# Patient Record
Sex: FEMALE | Race: OTHER | NOT HISPANIC OR LATINO
[De-identification: names, ages, dates, MRNs, and addresses within clinical notes are randomized per-mention and may not be internally consistent; named-entity substitution may affect disease eponyms.]

---

## 2017-06-11 ENCOUNTER — FORM ENCOUNTER (OUTPATIENT)
Age: 62
End: 2017-06-11

## 2017-06-12 ENCOUNTER — APPOINTMENT (OUTPATIENT)
Dept: MAMMOGRAPHY | Facility: IMAGING CENTER | Age: 62
End: 2017-06-12
Payer: COMMERCIAL

## 2017-06-12 ENCOUNTER — APPOINTMENT (OUTPATIENT)
Dept: ULTRASOUND IMAGING | Facility: IMAGING CENTER | Age: 62
End: 2017-06-12
Payer: COMMERCIAL

## 2017-06-12 ENCOUNTER — OUTPATIENT (OUTPATIENT)
Dept: OUTPATIENT SERVICES | Facility: HOSPITAL | Age: 62
LOS: 1 days | End: 2017-06-12
Payer: COMMERCIAL

## 2017-06-12 DIAGNOSIS — N64.89 OTHER SPECIFIED DISORDERS OF BREAST: ICD-10-CM

## 2017-06-12 PROCEDURE — 76641 ULTRASOUND BREAST COMPLETE: CPT | Mod: 26,50

## 2017-06-12 PROCEDURE — G0202: CPT | Mod: 26

## 2017-06-12 PROCEDURE — 77063 BREAST TOMOSYNTHESIS BI: CPT | Mod: 26

## 2017-06-12 PROCEDURE — 76641 ULTRASOUND BREAST COMPLETE: CPT

## 2017-06-12 PROCEDURE — 77063 BREAST TOMOSYNTHESIS BI: CPT

## 2017-06-12 PROCEDURE — 77067 SCR MAMMO BI INCL CAD: CPT

## 2017-06-27 ENCOUNTER — RESULT REVIEW (OUTPATIENT)
Age: 62
End: 2017-06-27

## 2017-07-24 ENCOUNTER — APPOINTMENT (OUTPATIENT)
Dept: SURGICAL ONCOLOGY | Facility: CLINIC | Age: 62
End: 2017-07-24

## 2017-07-24 VITALS
HEIGHT: 61 IN | SYSTOLIC BLOOD PRESSURE: 106 MMHG | HEART RATE: 71 BPM | WEIGHT: 98 LBS | DIASTOLIC BLOOD PRESSURE: 70 MMHG | RESPIRATION RATE: 15 BRPM | BODY MASS INDEX: 18.5 KG/M2

## 2017-07-24 DIAGNOSIS — R92.8 OTHER ABNORMAL AND INCONCLUSIVE FINDINGS ON DIAGNOSTIC IMAGING OF BREAST: ICD-10-CM

## 2017-07-24 DIAGNOSIS — Z80.3 FAMILY HISTORY OF MALIGNANT NEOPLASM OF BREAST: ICD-10-CM

## 2018-07-16 ENCOUNTER — FORM ENCOUNTER (OUTPATIENT)
Age: 63
End: 2018-07-16

## 2018-07-17 ENCOUNTER — APPOINTMENT (OUTPATIENT)
Dept: MAMMOGRAPHY | Facility: IMAGING CENTER | Age: 63
End: 2018-07-17
Payer: COMMERCIAL

## 2018-07-17 ENCOUNTER — APPOINTMENT (OUTPATIENT)
Dept: ULTRASOUND IMAGING | Facility: IMAGING CENTER | Age: 63
End: 2018-07-17
Payer: COMMERCIAL

## 2018-07-17 ENCOUNTER — OUTPATIENT (OUTPATIENT)
Dept: OUTPATIENT SERVICES | Facility: HOSPITAL | Age: 63
LOS: 1 days | End: 2018-07-17
Payer: COMMERCIAL

## 2018-07-17 DIAGNOSIS — N64.89 OTHER SPECIFIED DISORDERS OF BREAST: ICD-10-CM

## 2018-07-17 PROCEDURE — 76641 ULTRASOUND BREAST COMPLETE: CPT | Mod: 26,50

## 2018-07-17 PROCEDURE — 77067 SCR MAMMO BI INCL CAD: CPT | Mod: 26

## 2018-07-17 PROCEDURE — 77063 BREAST TOMOSYNTHESIS BI: CPT

## 2018-07-17 PROCEDURE — 77063 BREAST TOMOSYNTHESIS BI: CPT | Mod: 26

## 2018-07-17 PROCEDURE — 76641 ULTRASOUND BREAST COMPLETE: CPT

## 2018-07-17 PROCEDURE — 77067 SCR MAMMO BI INCL CAD: CPT

## 2018-07-19 PROBLEM — R92.8 ABNORMAL FINDING ON BREAST IMAGING: Status: ACTIVE | Noted: 2018-07-19

## 2018-07-23 ENCOUNTER — APPOINTMENT (OUTPATIENT)
Dept: SURGICAL ONCOLOGY | Facility: CLINIC | Age: 63
End: 2018-07-23
Payer: COMMERCIAL

## 2018-07-23 VITALS
WEIGHT: 100 LBS | SYSTOLIC BLOOD PRESSURE: 100 MMHG | BODY MASS INDEX: 18.88 KG/M2 | OXYGEN SATURATION: 98 % | DIASTOLIC BLOOD PRESSURE: 65 MMHG | HEART RATE: 70 BPM | HEIGHT: 61 IN

## 2018-07-23 PROCEDURE — 99213 OFFICE O/P EST LOW 20 MIN: CPT

## 2018-07-24 ENCOUNTER — FORM ENCOUNTER (OUTPATIENT)
Age: 63
End: 2018-07-24

## 2018-07-25 ENCOUNTER — OUTPATIENT (OUTPATIENT)
Dept: OUTPATIENT SERVICES | Facility: HOSPITAL | Age: 63
LOS: 1 days | End: 2018-07-25
Payer: COMMERCIAL

## 2018-07-25 ENCOUNTER — APPOINTMENT (OUTPATIENT)
Dept: ULTRASOUND IMAGING | Facility: IMAGING CENTER | Age: 63
End: 2018-07-25
Payer: COMMERCIAL

## 2018-07-25 ENCOUNTER — APPOINTMENT (OUTPATIENT)
Dept: MAMMOGRAPHY | Facility: IMAGING CENTER | Age: 63
End: 2018-07-25
Payer: COMMERCIAL

## 2018-07-25 ENCOUNTER — RESULT REVIEW (OUTPATIENT)
Age: 63
End: 2018-07-25

## 2018-07-25 DIAGNOSIS — R92.8 OTHER ABNORMAL AND INCONCLUSIVE FINDINGS ON DIAGNOSTIC IMAGING OF BREAST: ICD-10-CM

## 2018-07-25 PROCEDURE — 76642 ULTRASOUND BREAST LIMITED: CPT | Mod: 26,RT

## 2018-07-25 PROCEDURE — 76642 ULTRASOUND BREAST LIMITED: CPT

## 2018-07-25 PROCEDURE — 77065 DX MAMMO INCL CAD UNI: CPT | Mod: 26,RT

## 2018-07-25 PROCEDURE — G0279: CPT | Mod: 26

## 2018-07-25 PROCEDURE — G0279: CPT

## 2018-07-25 PROCEDURE — 77065 DX MAMMO INCL CAD UNI: CPT

## 2019-07-09 ENCOUNTER — RESULT REVIEW (OUTPATIENT)
Age: 64
End: 2019-07-09

## 2019-07-17 ENCOUNTER — FORM ENCOUNTER (OUTPATIENT)
Age: 64
End: 2019-07-17

## 2019-07-18 ENCOUNTER — OUTPATIENT (OUTPATIENT)
Dept: OUTPATIENT SERVICES | Facility: HOSPITAL | Age: 64
LOS: 1 days | End: 2019-07-18
Payer: COMMERCIAL

## 2019-07-18 ENCOUNTER — APPOINTMENT (OUTPATIENT)
Age: 64
End: 2019-07-18
Payer: COMMERCIAL

## 2019-07-18 DIAGNOSIS — Z00.8 ENCOUNTER FOR OTHER GENERAL EXAMINATION: ICD-10-CM

## 2019-07-18 PROCEDURE — 77067 SCR MAMMO BI INCL CAD: CPT | Mod: 26

## 2019-07-18 PROCEDURE — 77063 BREAST TOMOSYNTHESIS BI: CPT | Mod: 26

## 2019-07-18 PROCEDURE — 76641 ULTRASOUND BREAST COMPLETE: CPT

## 2019-07-18 PROCEDURE — 77067 SCR MAMMO BI INCL CAD: CPT

## 2019-07-18 PROCEDURE — 76641 ULTRASOUND BREAST COMPLETE: CPT | Mod: 26,50

## 2019-07-18 PROCEDURE — 77063 BREAST TOMOSYNTHESIS BI: CPT

## 2019-07-22 ENCOUNTER — APPOINTMENT (OUTPATIENT)
Dept: SURGICAL ONCOLOGY | Facility: CLINIC | Age: 64
End: 2019-07-22
Payer: COMMERCIAL

## 2019-07-22 VITALS
BODY MASS INDEX: 18.69 KG/M2 | HEIGHT: 61 IN | SYSTOLIC BLOOD PRESSURE: 96 MMHG | HEART RATE: 67 BPM | OXYGEN SATURATION: 98 % | RESPIRATION RATE: 15 BRPM | WEIGHT: 99 LBS | DIASTOLIC BLOOD PRESSURE: 62 MMHG

## 2019-07-22 PROCEDURE — 99214 OFFICE O/P EST MOD 30 MIN: CPT

## 2019-11-25 ENCOUNTER — OUTPATIENT (OUTPATIENT)
Dept: OUTPATIENT SERVICES | Facility: HOSPITAL | Age: 64
LOS: 1 days | End: 2019-11-25

## 2019-11-25 ENCOUNTER — APPOINTMENT (OUTPATIENT)
Dept: ULTRASOUND IMAGING | Facility: IMAGING CENTER | Age: 64
End: 2019-11-25

## 2019-11-25 DIAGNOSIS — N64.89 OTHER SPECIFIED DISORDERS OF BREAST: ICD-10-CM

## 2019-11-25 DIAGNOSIS — R92.8 OTHER ABNORMAL AND INCONCLUSIVE FINDINGS ON DIAGNOSTIC IMAGING OF BREAST: ICD-10-CM

## 2020-06-16 NOTE — HISTORY OF PRESENT ILLNESS
[de-identified] : 64 year-old lady who was diagnosed with bilateral radial scars on surgical biopsies in 2005.\par \par Declined tamoxifen for risk reduction.\par \par +FH:\par Paul who had breast cancer at age 51, she never had genetic testing.\par \par \par She is asymptomatic\par \par Gynecologist, Aleshia MONTANEZ, 2019 visit was okay\par 2018 visit is scheduled\par \par Reproductive history:\par menarch @14\par @37\par \par PMD: Jose Martin GALVAN, recent dx of osteopenia (being monitored for now)\par \par She currently takes no prescription medications\par \par Colonoscopy at age 53 was unremarkable, she prefers not to go for any further followup.\par (Dr. Anne Goss)

## 2020-06-16 NOTE — ASSESSMENT
[FreeTextEntry1] : Clinically doing well.\par \par July 2019 bilateral mammogram and sonogram at 450: BI-RADS 3.\par 3 x 2 x 3 mm nodule, left breast 12:00, hypoechoic nodule versus complicated cyst, 6 month followup ultrasound recommended.\par She currently spends November through March in [place], so we will repeat the left breast ultrasound at a four-month interval, November 2019, and a prescription was entered today.\par \par She declines breast MRIs as part of her surveillance imaging.\par \par Clinically doing well.\par \par If no problems we will see her in another year, sooner if needed\par \par May permanently relocated to [place].....\par \par \par 06-16-20.\par Patient called asking I enter prescriptions for annual breast imaging - done.

## 2020-06-16 NOTE — PHYSICAL EXAM
[Normal] : supple, no neck mass and thyroid not enlarged [Normal Neck Lymph Nodes] : normal neck lymph nodes  [Normal Supraclavicular Lymph Nodes] : normal supraclavicular lymph nodes [Normal Axillary Lymph Nodes] : normal axillary lymph nodes [Normal] : normal appearance, no rash, nodules, vesicles, ulcers, erythema [de-identified] : below [de-identified] : Groins not examined

## 2020-06-16 NOTE — REVIEW OF SYSTEMS
[Negative] : Integumentary [de-identified] : Osteopenia [FreeTextEntry1] : increased risk of breast cancer

## 2020-06-16 NOTE — REASON FOR VISIT
[Follow-Up Visit] : a follow-up visit for [Other: _____] : [unfilled] [FreeTextEntry2] : Bilateral breast radial scar

## 2020-07-20 ENCOUNTER — APPOINTMENT (OUTPATIENT)
Dept: ULTRASOUND IMAGING | Facility: IMAGING CENTER | Age: 65
End: 2020-07-20
Payer: COMMERCIAL

## 2020-07-20 ENCOUNTER — OUTPATIENT (OUTPATIENT)
Dept: OUTPATIENT SERVICES | Facility: HOSPITAL | Age: 65
LOS: 1 days | End: 2020-07-20
Payer: COMMERCIAL

## 2020-07-20 ENCOUNTER — APPOINTMENT (OUTPATIENT)
Dept: MAMMOGRAPHY | Facility: IMAGING CENTER | Age: 65
End: 2020-07-20
Payer: COMMERCIAL

## 2020-07-20 ENCOUNTER — RESULT REVIEW (OUTPATIENT)
Age: 65
End: 2020-07-20

## 2020-07-20 DIAGNOSIS — Z00.8 ENCOUNTER FOR OTHER GENERAL EXAMINATION: ICD-10-CM

## 2020-07-20 PROCEDURE — 77067 SCR MAMMO BI INCL CAD: CPT | Mod: 26

## 2020-07-20 PROCEDURE — 76641 ULTRASOUND BREAST COMPLETE: CPT | Mod: 26,LT

## 2020-07-20 PROCEDURE — 77063 BREAST TOMOSYNTHESIS BI: CPT | Mod: 26

## 2020-07-20 PROCEDURE — 76641 ULTRASOUND BREAST COMPLETE: CPT | Mod: 26,RT

## 2020-07-20 PROCEDURE — 77063 BREAST TOMOSYNTHESIS BI: CPT

## 2020-07-20 PROCEDURE — 77067 SCR MAMMO BI INCL CAD: CPT

## 2020-07-20 PROCEDURE — 76641 ULTRASOUND BREAST COMPLETE: CPT

## 2020-07-27 ENCOUNTER — APPOINTMENT (OUTPATIENT)
Dept: SURGICAL ONCOLOGY | Facility: CLINIC | Age: 65
End: 2020-07-27
Payer: COMMERCIAL

## 2020-07-27 VITALS
SYSTOLIC BLOOD PRESSURE: 126 MMHG | DIASTOLIC BLOOD PRESSURE: 63 MMHG | OXYGEN SATURATION: 98 % | RESPIRATION RATE: 16 BRPM | TEMPERATURE: 98.6 F | HEART RATE: 86 BPM

## 2020-07-27 PROCEDURE — 99214 OFFICE O/P EST MOD 30 MIN: CPT

## 2020-08-10 ENCOUNTER — RESULT REVIEW (OUTPATIENT)
Age: 65
End: 2020-08-10

## 2021-06-14 NOTE — PHYSICAL EXAM
[Normal] : supple, no neck mass and thyroid not enlarged [Normal Neck Lymph Nodes] : normal neck lymph nodes  [Normal Supraclavicular Lymph Nodes] : normal supraclavicular lymph nodes [Normal] : full range of motion and no deformities appreciated [Normal Axillary Lymph Nodes] : normal axillary lymph nodes [de-identified] : Groins not examined [de-identified] : below

## 2021-06-14 NOTE — REASON FOR VISIT
[Follow-Up Visit] : a follow-up visit for [Other: _____] : [unfilled] [FreeTextEntry2] : hx of bilateral radial scar (breasts)

## 2021-06-14 NOTE — HISTORY OF PRESENT ILLNESS
[de-identified] : 65 year-old lady who was diagnosed with bilateral radial scars on surgical biopsies in 2005 (Dr Chava Hernandez).\par \par Declined consultation to discuss tamoxifen for risk reduction.\par \par +FH:\par Sister who had breast cancer at age 50, she never had genetic testing.\par \par \par She is asymptomatic\par \par Gynecologist, Aleshia MONTANEZ, 2019 visit was okay\par \par \par Reproductive history:\par menarche @14\par @37\par \par DECLINES MRI SURVEILLANCE\par \par \par PMD: Jose Martin WYNER\par \par No pacemaker or defibrillator\par No anticoagulants\par \par +Thalassemia minor\par \par +Osteoporosis\par Started on alendronate by her internist.\par She has not yet had to see an endocrinologist.\par \par No other prescription medications\par \par \par Colonoscopy at age 53 was unremarkable, she prefers NOT to go for any further followup.\par (Dr. Anne Goss)

## 2021-06-14 NOTE — ASSESSMENT
[FreeTextEntry1] : Clinically doing well.\par \par July 2020:\par Bilateral mammo/sono @450: B2\par Rx entered for July 2021\par \par She declines breast MRIs as part of her surveillance imaging.\par \par Clinically doing well.\par \par If no problems we will see her in another year, sooner if needed\par \par May permanently relocated to [place].....\par

## 2021-07-21 ENCOUNTER — APPOINTMENT (OUTPATIENT)
Dept: MAMMOGRAPHY | Facility: IMAGING CENTER | Age: 66
End: 2021-07-21
Payer: MEDICARE

## 2021-07-21 ENCOUNTER — APPOINTMENT (OUTPATIENT)
Dept: ULTRASOUND IMAGING | Facility: IMAGING CENTER | Age: 66
End: 2021-07-21
Payer: MEDICARE

## 2021-07-21 ENCOUNTER — RESULT REVIEW (OUTPATIENT)
Age: 66
End: 2021-07-21

## 2021-07-21 ENCOUNTER — OUTPATIENT (OUTPATIENT)
Dept: OUTPATIENT SERVICES | Facility: HOSPITAL | Age: 66
LOS: 1 days | End: 2021-07-21
Payer: MEDICARE

## 2021-07-21 DIAGNOSIS — Z00.00 ENCOUNTER FOR GENERAL ADULT MEDICAL EXAMINATION WITHOUT ABNORMAL FINDINGS: ICD-10-CM

## 2021-07-21 PROCEDURE — 76641 ULTRASOUND BREAST COMPLETE: CPT | Mod: 26,50

## 2021-07-21 PROCEDURE — 77067 SCR MAMMO BI INCL CAD: CPT | Mod: 26

## 2021-07-21 PROCEDURE — 77067 SCR MAMMO BI INCL CAD: CPT

## 2021-07-21 PROCEDURE — 76641 ULTRASOUND BREAST COMPLETE: CPT

## 2021-07-21 PROCEDURE — 77063 BREAST TOMOSYNTHESIS BI: CPT | Mod: 26

## 2021-07-21 PROCEDURE — 77063 BREAST TOMOSYNTHESIS BI: CPT

## 2021-08-02 ENCOUNTER — APPOINTMENT (OUTPATIENT)
Dept: SURGICAL ONCOLOGY | Facility: CLINIC | Age: 66
End: 2021-08-02
Payer: MEDICARE

## 2021-08-02 VITALS
HEART RATE: 79 BPM | BODY MASS INDEX: 18.88 KG/M2 | OXYGEN SATURATION: 100 % | RESPIRATION RATE: 16 BRPM | WEIGHT: 100 LBS | SYSTOLIC BLOOD PRESSURE: 113 MMHG | DIASTOLIC BLOOD PRESSURE: 69 MMHG | HEIGHT: 61 IN | TEMPERATURE: 98.1 F

## 2021-08-02 PROCEDURE — 99214 OFFICE O/P EST MOD 30 MIN: CPT

## 2021-08-02 NOTE — REASON FOR VISIT
[Follow-Up Visit] : a follow-up visit for [Other: _____] : [unfilled] [FreeTextEntry2] : Radial scar lesions of both breasts

## 2021-08-02 NOTE — REVIEW OF SYSTEMS
[Negative] : Integumentary [de-identified] : Osteoporosis [FreeTextEntry1] : Radial scar lesion of the breast

## 2021-08-02 NOTE — ASSESSMENT
[FreeTextEntry1] : Clinically doing well.\par \par 7/21/2021:\par Bilateral mammo/sono @450: B2\par Rx entered for July 2022\par \par Clinically doing well.\par \par If no problems we will see her in another year, sooner if needed\par \par May permanently relocated to [place].....\par

## 2021-08-02 NOTE — PHYSICAL EXAM
[Normal] : supple, no neck mass and thyroid not enlarged [Normal Neck Lymph Nodes] : normal neck lymph nodes  [Normal Supraclavicular Lymph Nodes] : normal supraclavicular lymph nodes [Normal Axillary Lymph Nodes] : normal axillary lymph nodes [Normal] : normal appearance, no rash, nodules, vesicles, ulcers, erythema [de-identified] : Groins not examined [de-identified] : below

## 2021-08-10 ENCOUNTER — RESULT REVIEW (OUTPATIENT)
Age: 66
End: 2021-08-10

## 2022-07-25 ENCOUNTER — OUTPATIENT (OUTPATIENT)
Dept: OUTPATIENT SERVICES | Facility: HOSPITAL | Age: 67
LOS: 1 days | End: 2022-07-25
Payer: MEDICARE

## 2022-07-25 ENCOUNTER — RESULT REVIEW (OUTPATIENT)
Age: 67
End: 2022-07-25

## 2022-07-25 ENCOUNTER — APPOINTMENT (OUTPATIENT)
Dept: MAMMOGRAPHY | Facility: IMAGING CENTER | Age: 67
End: 2022-07-25

## 2022-07-25 ENCOUNTER — APPOINTMENT (OUTPATIENT)
Dept: ULTRASOUND IMAGING | Facility: IMAGING CENTER | Age: 67
End: 2022-07-25

## 2022-07-25 DIAGNOSIS — N64.89 OTHER SPECIFIED DISORDERS OF BREAST: ICD-10-CM

## 2022-07-25 PROCEDURE — 76641 ULTRASOUND BREAST COMPLETE: CPT

## 2022-07-25 PROCEDURE — 77063 BREAST TOMOSYNTHESIS BI: CPT

## 2022-07-25 PROCEDURE — 77063 BREAST TOMOSYNTHESIS BI: CPT | Mod: 26

## 2022-07-25 PROCEDURE — 76641 ULTRASOUND BREAST COMPLETE: CPT | Mod: 26,50

## 2022-07-25 PROCEDURE — 77067 SCR MAMMO BI INCL CAD: CPT | Mod: 26

## 2022-07-25 PROCEDURE — 77067 SCR MAMMO BI INCL CAD: CPT

## 2022-08-21 RX ORDER — ALENDRONATE SODIUM 35 MG/1
35 TABLET ORAL
Qty: 12 | Refills: 0 | Status: ACTIVE | COMMUNITY
Start: 2022-01-28

## 2022-08-22 ENCOUNTER — APPOINTMENT (OUTPATIENT)
Dept: SURGICAL ONCOLOGY | Facility: CLINIC | Age: 67
End: 2022-08-22

## 2022-08-22 VITALS
HEART RATE: 75 BPM | WEIGHT: 100 LBS | DIASTOLIC BLOOD PRESSURE: 70 MMHG | TEMPERATURE: 96.9 F | HEIGHT: 61 IN | SYSTOLIC BLOOD PRESSURE: 112 MMHG | RESPIRATION RATE: 16 BRPM | BODY MASS INDEX: 18.88 KG/M2 | OXYGEN SATURATION: 98 %

## 2022-08-22 DIAGNOSIS — N64.89 OTHER SPECIFIED DISORDERS OF BREAST: ICD-10-CM

## 2022-08-22 PROCEDURE — 99214 OFFICE O/P EST MOD 30 MIN: CPT

## 2022-08-22 NOTE — HISTORY OF PRESENT ILLNESS
[de-identified] : 67 year-old lady who was diagnosed with bilateral radial scars on surgical biopsies in 2005 (Dr Chava Hernandez).\par \par Declined consultation to discuss tamoxifen for risk reduction.\par \par She is asymptomatic.\par \par \par No personal history of malignancy.\par \par +FH:\par Sister who had breast cancer at age 50, she never had genetic testing.\par \par No relatives with ovarian cancer.\par \par NO other relatives with a history of malignancy.\par \par \par Reproductive history:\par menarche @14\par @37.\par Natural menopause at 51\par \par \par Breast cancer risk score =18.8%.\par \par \par PMD: Jose Martin COLE, retired in .\par She now sees Dr. BLOUNT in [facility].\par \par No pacemaker or defibrillator\par No anticoagulants\par \par +Thalassemia minor.\par She does not see a hematologist\par \par +Osteoporosis\par Started on alendronate by her internist.\par She has not yet had to see an endocrinologist.\par \par No other prescription medications\par \par \par Gynecologist, Aleshia MONTANEZ, 2021 visit was okay.\par \par \par Colonoscopy at age 53 was unremarkable, she prefers NOT to go for any further followup.\par (Dr. Anne Goss)

## 2022-08-22 NOTE — REVIEW OF SYSTEMS
[Negative] : Integumentary [de-identified] : Osteoporosis [FreeTextEntry1] : History of thalassemia minor

## 2022-08-22 NOTE — PHYSICAL EXAM
[Normal] : supple, no neck mass and thyroid not enlarged [Normal Neck Lymph Nodes] : normal neck lymph nodes  [Normal Supraclavicular Lymph Nodes] : normal supraclavicular lymph nodes [Normal Axillary Lymph Nodes] : normal axillary lymph nodes [Normal] : normal appearance, no rash, nodules, vesicles, ulcers, erythema [de-identified] : Groins not examined [de-identified] : below

## 2022-08-22 NOTE — ASSESSMENT
[FreeTextEntry1] : Clinically doing well.\par \par July 2022:\par Bilateral mammogram and sonogram at 450: BI-RADS 2.\par Rx entered for July 2023\par \par Clinically doing well.\par \par \par May permanently relocated to [place].....\par \par We will see her on an as-needed basis\par

## 2022-08-22 NOTE — REASON FOR VISIT
[Follow-Up Visit] : a follow-up visit for [Other: _____] : [unfilled] [FreeTextEntry2] : History of radial scar lesion of the breast, breast cancer risk score = 18.8